# Patient Record
Sex: FEMALE | Race: WHITE | HISPANIC OR LATINO | Employment: UNEMPLOYED | URBAN - METROPOLITAN AREA
[De-identification: names, ages, dates, MRNs, and addresses within clinical notes are randomized per-mention and may not be internally consistent; named-entity substitution may affect disease eponyms.]

---

## 2017-02-23 ENCOUNTER — HOSPITAL ENCOUNTER (EMERGENCY)
Facility: HOSPITAL | Age: 42
Discharge: HOME | End: 2017-02-23
Attending: EMERGENCY MEDICINE
Payer: MEDICAID

## 2017-02-23 VITALS
SYSTOLIC BLOOD PRESSURE: 112 MMHG | DIASTOLIC BLOOD PRESSURE: 58 MMHG | BODY MASS INDEX: 30.61 KG/M2 | HEIGHT: 67 IN | WEIGHT: 195 LBS | TEMPERATURE: 98 F | OXYGEN SATURATION: 96 % | HEART RATE: 65 BPM | RESPIRATION RATE: 16 BRPM

## 2017-02-23 DIAGNOSIS — R07.89 NON-CARDIAC CHEST PAIN: ICD-10-CM

## 2017-02-23 DIAGNOSIS — R07.89 CHEST WALL PAIN: Primary | ICD-10-CM

## 2017-02-23 LAB
ALBUMIN SERPL BCP-MCNC: 3.7 G/DL
ALP SERPL-CCNC: 92 U/L
ALT SERPL W/O P-5'-P-CCNC: 20 U/L
ANION GAP SERPL CALC-SCNC: 9 MMOL/L
AST SERPL-CCNC: 18 U/L
B-HCG UR QL: NEGATIVE
BASOPHILS # BLD AUTO: 0.02 K/UL
BASOPHILS NFR BLD: 0.2 %
BILIRUB SERPL-MCNC: 0.4 MG/DL
BNP SERPL-MCNC: <10 PG/ML
BUN SERPL-MCNC: 13 MG/DL
CALCIUM SERPL-MCNC: 8.9 MG/DL
CHLORIDE SERPL-SCNC: 107 MMOL/L
CO2 SERPL-SCNC: 22 MMOL/L
CREAT SERPL-MCNC: 0.7 MG/DL
CTP QC/QA: YES
DIFFERENTIAL METHOD: ABNORMAL
EOSINOPHIL # BLD AUTO: 0.2 K/UL
EOSINOPHIL NFR BLD: 2.2 %
ERYTHROCYTE [DISTWIDTH] IN BLOOD BY AUTOMATED COUNT: 16.1 %
EST. GFR  (AFRICAN AMERICAN): >60 ML/MIN/1.73 M^2
EST. GFR  (NON AFRICAN AMERICAN): >60 ML/MIN/1.73 M^2
GLUCOSE SERPL-MCNC: 100 MG/DL
HCT VFR BLD AUTO: 33.6 %
HGB BLD-MCNC: 11.1 G/DL
INR PPP: 1
LYMPHOCYTES # BLD AUTO: 3.2 K/UL
LYMPHOCYTES NFR BLD: 36.8 %
MCH RBC QN AUTO: 25.1 PG
MCHC RBC AUTO-ENTMCNC: 33 %
MCV RBC AUTO: 76 FL
MONOCYTES # BLD AUTO: 0.6 K/UL
MONOCYTES NFR BLD: 6.3 %
NEUTROPHILS # BLD AUTO: 4.8 K/UL
NEUTROPHILS NFR BLD: 54.4 %
PLATELET # BLD AUTO: 435 K/UL
PMV BLD AUTO: 10.4 FL
POCT GLUCOSE: 107 MG/DL (ref 70–110)
POTASSIUM SERPL-SCNC: 4.1 MMOL/L
PROT SERPL-MCNC: 7.8 G/DL
PROTHROMBIN TIME: 10.8 SEC
RBC # BLD AUTO: 4.43 M/UL
SODIUM SERPL-SCNC: 138 MMOL/L
TROPONIN I SERPL DL<=0.01 NG/ML-MCNC: <0.006 NG/ML
WBC # BLD AUTO: 8.75 K/UL

## 2017-02-23 PROCEDURE — 93005 ELECTROCARDIOGRAM TRACING: CPT

## 2017-02-23 PROCEDURE — 99284 EMERGENCY DEPT VISIT MOD MDM: CPT | Mod: 25

## 2017-02-23 PROCEDURE — 85025 COMPLETE CBC W/AUTO DIFF WBC: CPT

## 2017-02-23 PROCEDURE — 83880 ASSAY OF NATRIURETIC PEPTIDE: CPT

## 2017-02-23 PROCEDURE — 25000003 PHARM REV CODE 250: Performed by: EMERGENCY MEDICINE

## 2017-02-23 PROCEDURE — 84484 ASSAY OF TROPONIN QUANT: CPT

## 2017-02-23 PROCEDURE — 82962 GLUCOSE BLOOD TEST: CPT

## 2017-02-23 PROCEDURE — 63600175 PHARM REV CODE 636 W HCPCS: Performed by: EMERGENCY MEDICINE

## 2017-02-23 PROCEDURE — 96375 TX/PRO/DX INJ NEW DRUG ADDON: CPT

## 2017-02-23 PROCEDURE — 80053 COMPREHEN METABOLIC PANEL: CPT

## 2017-02-23 PROCEDURE — 96374 THER/PROPH/DIAG INJ IV PUSH: CPT

## 2017-02-23 PROCEDURE — 85610 PROTHROMBIN TIME: CPT

## 2017-02-23 PROCEDURE — 81025 URINE PREGNANCY TEST: CPT | Performed by: EMERGENCY MEDICINE

## 2017-02-23 RX ORDER — ONDANSETRON 2 MG/ML
4 INJECTION INTRAMUSCULAR; INTRAVENOUS
Status: COMPLETED | OUTPATIENT
Start: 2017-02-23 | End: 2017-02-23

## 2017-02-23 RX ORDER — KETOROLAC TROMETHAMINE 10 MG/1
10 TABLET, FILM COATED ORAL EVERY 12 HOURS PRN
Qty: 6 TABLET | Refills: 0 | Status: SHIPPED | OUTPATIENT
Start: 2017-02-23 | End: 2017-02-26

## 2017-02-23 RX ORDER — ASPIRIN 325 MG
325 TABLET ORAL
Status: COMPLETED | OUTPATIENT
Start: 2017-02-23 | End: 2017-02-23

## 2017-02-23 RX ORDER — MORPHINE SULFATE 10 MG/ML
2 INJECTION INTRAMUSCULAR; INTRAVENOUS; SUBCUTANEOUS ONCE
Status: COMPLETED | OUTPATIENT
Start: 2017-02-23 | End: 2017-02-23

## 2017-02-23 RX ORDER — CETIRIZINE HYDROCHLORIDE 5 MG/1
5 TABLET ORAL DAILY
COMMUNITY
End: 2017-02-23

## 2017-02-23 RX ORDER — KETOROLAC TROMETHAMINE 30 MG/ML
15 INJECTION, SOLUTION INTRAMUSCULAR; INTRAVENOUS
Status: COMPLETED | OUTPATIENT
Start: 2017-02-23 | End: 2017-02-23

## 2017-02-23 RX ADMIN — ASPIRIN 325 MG ORAL TABLET 325 MG: 325 PILL ORAL at 09:02

## 2017-02-23 RX ADMIN — MORPHINE SULFATE 2 MG: 10 INJECTION INTRAVENOUS at 09:02

## 2017-02-23 RX ADMIN — KETOROLAC TROMETHAMINE 15 MG: 30 INJECTION, SOLUTION INTRAMUSCULAR at 10:02

## 2017-02-23 RX ADMIN — ONDANSETRON 4 MG: 2 INJECTION INTRAMUSCULAR; INTRAVENOUS at 10:02

## 2017-02-23 NOTE — ED AVS SNAPSHOT
[facility]  [address]  [place] [address]               Brittni M Prabhu   2017  8:52 AM   ED    Descripción:  Female : 1975   Departamento:  [facility]           Covarrubias Cuidado fue coordinado por:     Provider Role From To    Damir Wang MD Attending Provider 17 0852 --      Razón de la rachel     Chest Pain           Diagnósticos de Esta Visita        Comentarios    Chest wall pain    -  Primario     Non-cardiac chest pain           ED Disposition     ED Disposition Condition Comment    Discharge             Lista de tareas           Información de seguimiento     Realice un seguimiento con:  Prieto Olmstead III, MD    Cuándo:      Especialidad:  Internal Medicine    Información de contacto:    [address] [place] [address]  [phone]        Recetas para recoger        Disp Refills Start End    ketorolac (TORADOL) 10 mg tablet 6 tablet 0 2017    Take 1 tablet (10 mg total) by mouth every 12 (twelve) hours as needed for Pain. - Oral    ranitidine (ZANTAC) 150 MG capsule 60 capsule 11 2017    Take 1 capsule (150 mg total) by mouth 2 (two) times daily. - Oral      [facility]     [facility]ferm[facility] - Asistencia   Enfermeras registradas de [facility] pueden ayudarle a reservar shatni rachel, proveer educación para la marissa, asesoría clínica, y otros servicios de asesoramiento.   Llame para john servicio gratuito a [phone].             Medicamentos           EMPEZAR a peggy estos medicamentos NUEVOS        Refills    ketorolac (TORADOL) 10 mg tablet 0    Sig: Take 1 tablet (10 mg total) by mouth every 12 (twelve) hours as needed for Pain.    Categoría: Print    Vía: Oral    ranitidine (ZANTAC) 150 MG capsule 11    Sig: Take 1 capsule (150 mg total) by mouth 2 (two) times daily.    Categoría: Print    Vía: Oral      These medications were  "administered today        Dose Freq    aspirin tablet 325 mg 325 mg ED 1 Time    Sig: Take 1 tablet (325 mg total) by mouth ED 1 Time.    Categoría: Normal    Vía: Oral    morphine injection 2 mg 2 mg Once    Sig: Inject 0.2 mLs (2 mg total) into the vein once.    Categoría: Normal    Vía: Intravenous    ketorolac injection 15 mg 15 mg ED 1 Time    Sig: Inject 15 mg into the vein ED 1 Time.    Categoría: Normal    Vía: Intravenous    ondansetron injection 4 mg 4 mg ED 1 Time    Sig: Inject 4 mg into the vein ED 1 Time.    Categoría: Normal    Vía: Intravenous      DEJAR de peggy estos medicamentos     cetirizine (ZYRTEC) 5 MG tablet Take 5 mg by mouth once daily.           Verifique que la siguiente lista de medicamentos es shanti representación exacta de los medicamentos que está tomando actualmente. Si no hay ningunos reportados, la lista puede estar en rose. Si no es correcta, por favor póngase en contacto con eric proveedor de atención médica. Lleve esta lista con usted en aubrie de emergencia.           Medicamentos Actuales     acetaminophen (TYLENOL) 325 MG tablet Take 325 mg by mouth every 6 (six) hours as needed for Pain.    ketorolac (TORADOL) 10 mg tablet Take 1 tablet (10 mg total) by mouth every 12 (twelve) hours as needed for Pain.    ranitidine (ZANTAC) 150 MG capsule Take 1 capsule (150 mg total) by mouth 2 (two) times daily.           Información de referencia clínica           Maureen signos vitales soha     PS Pulso Temperatura Resp [place]    112/57 56 98.6 °F (37 °C) (Oral) 20 5' 7" (1.702 m) 88.5 kg (195 lb)    SpO2 BMI (IMC)                96% 30.54 kg/m2          Alergias     A partir del:  2/23/2017        No Known Allergies      Vacunas     Administradas en la fecha de la visita:  2/23/2017        None      ED Micro, Lab, POCT     Start Ordered       Status Ordering Provider    02/23/17 1021 02/23/17 1021  POCT glucose  Once      Final result     02/23/17 0854 02/23/17 0853    Once,   Status:  " Canceled      Canceled     02/23/17 0854 02/23/17 0853  POCT urine pregnancy  Once      Final result     02/23/17 0854 02/23/17 0853  CBC auto differential  STAT      Final result     02/23/17 0854 02/23/17 0853  Comprehensive metabolic panel  STAT      Final result     02/23/17 0854 02/23/17 0853  Protime-INR  STAT      Final result     02/23/17 0854 02/23/17 0853  Troponin I  Now then every 3 hours     Comments:  PLEASE REVIEW ORDER START TIME BEFORE MARKING SPECIMEN COLLECTED.   Start Status   02/23/17 0854 Final result   02/23/17 1154 Acknowledged       Acknowledged     02/23/17 0854 02/23/17 0853  B-Type natriuretic peptide (BNP)  STAT      Final result       ED Imaging Orders     Start Ordered       Status Ordering Provider    02/23/17 0854 02/23/17 0853  X-Ray Chest PA And Lateral  1 time imaging      Final result         Instrucciones a lashawn de oneida           Chest Wall Pain: Costochondritis    The chest pain that you have had today is caused by costochondritis. This condition is caused by an inflammation of the cartilage joining your ribs to your breastbone. It is not caused by heart or lung problems. The inflammation may have been brought on by a blow to the chest, lifting heavy objects, intense exercise, or an illness that made you cough and sneeze. It often occurs during times of emotional stress. It can be painful, but it is not dangerous. It usually goes away in 1 to 2 weeks. But it may happen again. Rarely, a more serious condition may cause symptoms similar to costochondritis. Thats why its important to watch for the warning signs listed below.  Home care  Follow these guidelines when caring for yourself at home:  · If you feel that emotional stress is a cause of your condition, try to figure out the sources of that stress. It may not be obvious! Learn ways to deal with the stress in your life. This can include regular exercise, muscle relaxation, meditation, or simply taking time out for yourself.  For more information about this, talk with your health care provider. Or go to your local library and look at books on stress reduction.  · You may use acetaminophen or ibuprofen to control pain, unless another pain medicine was prescribed. If you have liver disease or ever had a stomach ulcer, talk with your health care provider before using these medicines.  · You can also help ease pain by using a hot, wet compress or heating pad. Use this with or without a medicated skin cream that helps relieves pain.  · Do stretching exercise as advised by your provider.  · Take any prescribed medicines as directed.  Follow-up care  Follow up with your health care provider, or as advised, if you do not start to get better in the next 2 days.  When to seek medical advice  Call your health care provider right away if any of these occur:  · A change in the type of pain. Call if it feels different, becomes more serious, lasts longer, or spreads into your shoulder, arm, neck, jaw, or back.  · Shortness of breath or pain gets worse when you breathe  · Weakness, dizziness, or fainting  · Cough with dark-colored sputum (phlegm) or blood  · Abdominal pain  · Dark red or black stools  · Fever of 100.4ºF (38ºC) or higher, or as directed by your health care provider  Date Last Reviewed: 11/24/2014  © 2665-5531 [employer]. [address] [place] [address]. All rights reserved. This information is not intended as a substitute for professional medical care. Always follow your healthcare professional's instructions.          Registrarse para [facility]     La activación de eric cuenta [facility] es tan fácil franci 1-2-3!    1) Ir a [email or web address], seleccione Registrarse Ahora, meter el código de activación y eric fecha de nacimiento, y seleccione Próximo.    [id number]  Expires: 4/9/2017 11:48 AM      2) Crear un nombre de usuario y contraseña para usar cuando se visita [facility] en el futuro y selecciona shanti  pregunta de seguridad en aubrie de que pierda eric contraseña y seleccione Próximo.    3) Introduzca eric dirección de correo electrónico y sandra mark en Registrarse!    Información Adicional  Si tiene alguna pregunta, por favor, e-mail [email or web address] o llame al [phone] para hablar con nuestro personal. Recuerde, [facility] no debe ser usada para necesidades urgentes. En aubrie de emergencia médica, llame al [phone].         [facility] cumple con las leyes federales aplicables de derechos civiles y no discrimina por motivos de judy, color, origen nacional, edad, discapacidad, o sexo.        Language Assistance Services     ATTENTION: Language assistance services are available, free of charge. Please call [phone].      ATENCIÓN: Si habla español, tiene a eric disposición servicios gratuitos de asistencia lingüística. Llame al [phone].     CHÚ Ý: N?u b?n nói Ti?ng Vi?t, có các d?ch v? h? tr? ngôn ng? mi?n phí dành cho b?n. G?i s? [phone].                      [facility]  [address] [facility]  [place] [address]               Brittni Pelletier   2017  8:52 AM   ED    Description:  Female : 1975   Department:  [facility]           Your Care was Coordinated By:     Provider Role From To    Damir Wang MD Attending Provider 17 0129 --      Reason for Visit     Chest Pain           Diagnoses this Visit        Comments    Chest wall pain    -  Primary     Non-cardiac chest pain           ED Disposition     ED Disposition Condition Comment    Discharge             To Do List           Follow-up Information     Follow up with Prieto Olmstead III, MD In 1 week(s).    Specialty:  Internal Medicine    Contact information:    [address]  [address] [facility]  [place] [id number] [phone]         These Medications        Disp Refills Start End    ketorolac (TORADOL) 10 mg tablet 6 tablet 0 2017  2/26/2017    Take 1 tablet (10 mg total) by mouth every 12 (twelve) hours as needed for Pain. - Oral    ranitidine (ZANTAC) 150 MG capsule 60 capsule 11 2/23/2017 2/23/2018    Take 1 capsule (150 mg total) by mouth 2 (two) times daily. - Oral      [facility]     [facility] On Call Nurse Care Line - 24/7 Assistance  Registered nurses in the [facility] On Call Center provide clinical advisement, health education, appointment booking, and other advisory services.  Call for this free service at [phone].             Medications           START taking these NEW medications        Refills    ketorolac (TORADOL) 10 mg tablet 0    Sig: Take 1 tablet (10 mg total) by mouth every 12 (twelve) hours as needed for Pain.    Class: Print    Route: Oral    ranitidine (ZANTAC) 150 MG capsule 11    Sig: Take 1 capsule (150 mg total) by mouth 2 (two) times daily.    Class: Print    Route: Oral      These medications were administered today        Dose Freq    aspirin tablet 325 mg 325 mg ED 1 Time    Sig: Take 1 tablet (325 mg total) by mouth ED 1 Time.    Class: Normal    Route: Oral    morphine injection 2 mg 2 mg Once    Sig: Inject 0.2 mLs (2 mg total) into the vein once.    Class: Normal    Route: Intravenous    ketorolac injection 15 mg 15 mg ED 1 Time    Sig: Inject 15 mg into the vein ED 1 Time.    Class: Normal    Route: Intravenous    ondansetron injection 4 mg 4 mg ED 1 Time    Sig: Inject 4 mg into the vein ED 1 Time.    Class: Normal    Route: Intravenous      STOP taking these medications     cetirizine (ZYRTEC) 5 MG tablet Take 5 mg by mouth once daily.           Verify that the below list of medications is an accurate representation of the medications you are currently taking.  If none reported, the list may be blank. If incorrect, please contact your healthcare provider. Carry this list with you in case of emergency.           Current Medications     acetaminophen (TYLENOL) 325 MG tablet Take 325 mg by mouth  "every 6 (six) hours as needed for Pain.    ketorolac (TORADOL) 10 mg tablet Take 1 tablet (10 mg total) by mouth every 12 (twelve) hours as needed for Pain.    ranitidine (ZANTAC) 150 MG capsule Take 1 capsule (150 mg total) by mouth 2 (two) times daily.           Clinical Reference Information           Your Vitals Were     BP Pulse Temp Resp Height Weight    112/57 56 98.6 °F (37 °C) (Oral) 20 5' 7" (1.702 m) 88.5 kg (195 lb)    SpO2 BMI                96% 30.54 kg/m2          Allergies as of 2/23/2017     No Known Allergies      Immunizations Administered on Date of Encounter - 2/23/2017     None      ED Micro, Lab, POCT     Start Ordered       Status Ordering Provider    02/23/17 1021 02/23/17 1021  POCT glucose  Once      Final result     02/23/17 0854 02/23/17 0853    Once,   Status:  Canceled      Canceled     02/23/17 0854 02/23/17 0853  POCT urine pregnancy  Once      Final result     02/23/17 0854 02/23/17 0853  CBC auto differential  STAT      Final result     02/23/17 0854 02/23/17 0853  Comprehensive metabolic panel  STAT      Final result     02/23/17 0854 02/23/17 0853  Protime-INR  STAT      Final result     02/23/17 0854 02/23/17 0853  Troponin I  Now then every 3 hours     Comments:  PLEASE REVIEW ORDER START TIME BEFORE MARKING SPECIMEN COLLECTED.   Start Status   02/23/17 0854 Final result   02/23/17 1154 Acknowledged       Acknowledged     02/23/17 0854 02/23/17 0853  B-Type natriuretic peptide (BNP)  STAT      Final result       ED Imaging Orders     Start Ordered       Status Ordering Provider    02/23/17 0854 02/23/17 0853  X-Ray Chest PA And Lateral  1 time imaging      Final result         Discharge Instructions           Chest Wall Pain: Costochondritis    The chest pain that you have had today is caused by costochondritis. This condition is caused by an inflammation of the cartilage joining your ribs to your breastbone. It is not caused by heart or lung problems. The inflammation may have " been brought on by a blow to the chest, lifting heavy objects, intense exercise, or an illness that made you cough and sneeze. It often occurs during times of emotional stress. It can be painful, but it is not dangerous. It usually goes away in 1 to 2 weeks. But it may happen again. Rarely, a more serious condition may cause symptoms similar to costochondritis. Thats why its important to watch for the warning signs listed below.  Home care  Follow these guidelines when caring for yourself at home:  · If you feel that emotional stress is a cause of your condition, try to figure out the sources of that stress. It may not be obvious! Learn ways to deal with the stress in your life. This can include regular exercise, muscle relaxation, meditation, or simply taking time out for yourself. For more information about this, talk with your health care provider. Or go to your local library and look at books on stress reduction.  · You may use acetaminophen or ibuprofen to control pain, unless another pain medicine was prescribed. If you have liver disease or ever had a stomach ulcer, talk with your health care provider before using these medicines.  · You can also help ease pain by using a hot, wet compress or heating pad. Use this with or without a medicated skin cream that helps relieves pain.  · Do stretching exercise as advised by your provider.  · Take any prescribed medicines as directed.  Follow-up care  Follow up with your health care provider, or as advised, if you do not start to get better in the next 2 days.  When to seek medical advice  Call your health care provider right away if any of these occur:  · A change in the type of pain. Call if it feels different, becomes more serious, lasts longer, or spreads into your shoulder, arm, neck, jaw, or back.  · Shortness of breath or pain gets worse when you breathe  · Weakness, dizziness, or fainting  · Cough with dark-colored sputum (phlegm) or blood  · Abdominal  pain  · Dark red or black stools  · Fever of 100.4ºF (38ºC) or higher, or as directed by your health care provider  Date Last Reviewed: 11/24/2014  © 2155-3938 [employer]. [address] [place] [address], [place] [address]. All rights reserved. This information is not intended as a substitute for professional medical care. Always follow your healthcare professional's instructions.          [facility] Sign-Up     Activating your [facility] account is as easy as 1-2-3!     1) Visit [email or web address], select Sign Up Now, enter this activation code and your date of birth, then select Next.  [email or web address]  Expires: 4/9/2017 11:48 AM      2) Create a username and password to use when you visit [facility] in the future and select a security question in case you lose your password and select Next.    3) Enter your e-mail address and click Sign Up!    Additional Information  If you have questions, please e-mail [email or web address] or call [phone] to talk to our [facility] staff. Remember, [facility] is NOT to be used for urgent needs. For medical emergencies, dial 911.          [facility] complies with applicable Federal civil rights laws and does not discriminate on the basis of race, color, national origin, age, disability, or sex.        Language Assistance Services     ATTENTION: Language assistance services are available, free of charge. Please call [phone].      ATENCIÓN: Si habla jack, tiene a eric disposición servicios gratuitos de asistencia lingüística. Llame al [phone].     CHÚ Ý: N?u b?n nói Ti?ng Vi?t, có các d?ch v? h? tr? ngôn ng? mi?n phí dành cho b?n. G?i s? [phone].

## 2017-02-23 NOTE — ED PROVIDER NOTES
"Encounter Date: 2/23/2017    SCRIBE #1 NOTE: I, Alex Fuentes, am scribing for, and in the presence of,  Be Wang MD. I have scribed the following portions of the note - Other sections scribed: HPI and ROS.       History     Chief Complaint   Patient presents with    Chest Pain     pt presents to ED with c/o intermittent mid-sternal CP since 0400 "the pain is now a constant pound, like a pressure" pain radiates to right hand     Review of patient's allergies indicates:  No Known Allergies  HPI Comments: Chief Complaint: Chest Pain    HPI: This 41 y.o. Female with no known PMHx presents to the ED c/o left side chest pain. Symptoms were intermittent wit onset last night. However, symptoms have been constant since 4 am this morning. Pain is severe and described as a pressure. Pain also radiates to the back. There's associated SOB. Symptoms are not relieved with Tylenol. Patient denies any recent trauma. She also denies any fevers, cough or leg swelling. No previous episodes of current symptoms.      The history is provided by the patient. No  was used.     History reviewed. No pertinent past medical history.  No past medical history pertinent negatives.  No past surgical history on file.  No family history on file.  Social History   Substance Use Topics    Smoking status: Never Smoker    Smokeless tobacco: None    Alcohol use No     Review of Systems   Constitutional: Negative for chills and fever.   HENT: Negative for ear pain and sore throat.    Eyes: Negative for visual disturbance.   Respiratory: Positive for shortness of breath. Negative for cough, wheezing and stridor.    Cardiovascular: Positive for chest pain (chest wall). Negative for palpitations and leg swelling.   Gastrointestinal: Negative for abdominal pain, constipation, diarrhea, nausea and vomiting.   Genitourinary: Negative for difficulty urinating and dysuria.   Musculoskeletal: Negative for back pain.   Skin: " Negative for rash.   Neurological: Negative for dizziness, weakness, light-headedness and headaches.   Hematological: Does not bruise/bleed easily.       Physical Exam   Initial Vitals   BP Pulse Resp Temp SpO2   02/23/17 0850 02/23/17 0850 02/23/17 0850 02/23/17 0850 02/23/17 0850   134/63 64 20 98.6 °F (37 °C) 97 %     Physical Exam    Vitals reviewed.  Constitutional: She appears well-developed and well-nourished.   HENT:   Head: Normocephalic and atraumatic.   Nose: Nose normal.   Mouth/Throat: No oropharyngeal exudate.   Eyes: EOM are normal. Pupils are equal, round, and reactive to light.   Neck: Normal range of motion. Neck supple. No JVD present.   Cardiovascular: Regular rhythm and normal heart sounds. Exam reveals no gallop and no friction rub.    No murmur heard.  Pulmonary/Chest: Breath sounds normal. No stridor. No respiratory distress. She has no wheezes. She has no rhonchi. She has no rales. She exhibits tenderness and bony tenderness. She exhibits no laceration, no crepitus, no edema, no deformity, no swelling and no retraction.   Patient winces in pain with light palpation of the anterior chest wall.  The overlying skin appears normal.   Abdominal: Soft. Bowel sounds are normal. She exhibits no distension and no mass. There is no tenderness. There is no rebound and no guarding.   Musculoskeletal: Normal range of motion. She exhibits no edema or tenderness.   Neurological: She is alert and oriented to person, place, and time. She has normal strength. No sensory deficit.   Skin: Skin is warm and dry.   Psychiatric: She has a normal mood and affect. Thought content normal.         ED Course   Procedures  Labs Reviewed   CBC W/ AUTO DIFFERENTIAL - Abnormal; Notable for the following:        Result Value    Hemoglobin 11.1 (*)     Hematocrit 33.6 (*)     MCV 76 (*)     MCH 25.1 (*)     RDW 16.1 (*)     Platelets 435 (*)     All other components within normal limits    Narrative:     PLEASE REVIEW ORDER  START TIME BEFORE MARKING SPECIMEN  COLLECTED.   COMPREHENSIVE METABOLIC PANEL - Abnormal; Notable for the following:     CO2 22 (*)     All other components within normal limits    Narrative:     PLEASE REVIEW ORDER START TIME BEFORE MARKING SPECIMEN  COLLECTED.   PROTIME-INR    Narrative:     PLEASE REVIEW ORDER START TIME BEFORE MARKING SPECIMEN  COLLECTED.   TROPONIN I    Narrative:     PLEASE REVIEW ORDER START TIME BEFORE MARKING SPECIMEN  COLLECTED.   B-TYPE NATRIURETIC PEPTIDE    Narrative:     PLEASE REVIEW ORDER START TIME BEFORE MARKING SPECIMEN  COLLECTED.   TROPONIN I   POCT URINE PREGNANCY   POCT GLUCOSE     EKG Readings: (Independently Interpreted)   Initial Reading: No STEMI. Rhythm: Normal Sinus Rhythm. Ectopy: No Ectopy. Conduction: Normal. ST Segments: Normal ST Segments. T Waves: Normal. Clinical Impression: Normal Sinus Rhythm       X-Rays:   Independently Interpreted Readings:   Chest X-Ray: Normal heart size.  No infiltrates.  No acute abnormalities.     Medical Decision Making:   History:   Old Medical Records: I decided to obtain old medical records.      Medical decision-making:    The patient received a medical screening exam. If performed, the EKG was independently evaluated by me and is pending final cardiology evaluation.  If performed, all radiographic studies were independently evaluated by me and are pending final radiology evaluation. If labs were ordered, they were reviewed. Vital signs are independently assessed by me.  If performed, the pulse oximetry was independently evaluated by me.  I decided to obtain the patient's past medical record.  If available, I reviewed the patient's past medical record, including most recent labs and radiology reports.    This is an emergent evaluation for a patient with chest pain.The patient's pain is atypical for cardiac etiology.  I decided to obtain and review the patient's past medical record.        The vital signs are stable in the room.     CP is completely reproducible on palpation of the anterior chest wall.  The overlying skin appears normal and is without rash. Clinically, she has acute chest wall pain.   EKG is normal.  There is no evidence of STEMI or ischemia.    CXR is negative for pneumonia, pneumothorax and edema.  Troponin is negative and was drawn at least 8 hours since the onset of pain.  I doubt ACS.  BNP is negative.  There is no evidence of congestive heart failure.  The electrolytes are relatively normal.  The pt is not anemic.  Wells score zero and PERC negative. Doubt PE.     The pt's symptoms were treated with:    Medications   aspirin tablet 325 mg (325 mg Oral Given 2/23/17 0925)   morphine injection 2 mg (2 mg Intravenous Given 2/23/17 0925)   ketorolac injection 15 mg (15 mg Intravenous Given 2/23/17 1052)   ondansetron injection 4 mg (4 mg Intravenous Given 2/23/17 1053)       Currently the patient has a a non-diagnostic EKG with negative troponin in the emergency department.  I doubt acute coronary syndrome.  I did inform the patient that even with negative testing, we can never eliminate all risk.  I believe the patient is low risk with negative initial testing; they are appropriate for close outpatient follow-up.  The patient is aware of the small but persistent risk for MI/ACS with subsequent cardiac complications or death.  I have low suspicion for cardiopulmonary, vascular, infectious, respiratory, or other emergent medical condition based on my evaluation in the ED.     The patient's pain is currently improved.      The results and physical exam findings were reviewed with the patient. Pt agrees with assessment, disposition and treatment plan and has no further questions or complaints at this time.      FELIPE Wang M.D. 11:52 AM 2/23/2017         Scribe Attestation:   Scribe #1: I performed the above scribed service and the documentation accurately describes the services I performed. I attest to the accuracy of  the note.    Attending Attestation:           Physician Attestation for Scribe:  Physician Attestation Statement for Scribe #1: I, Be Wang MD, reviewed documentation, as scribed by Alex Fuentes in my presence, and it is both accurate and complete.                 ED Course     Clinical Impression:   The primary encounter diagnosis was Chest wall pain. A diagnosis of Non-cardiac chest pain was also pertinent to this visit.          Damir Wang MD  02/23/17 [id number]

## 2017-02-23 NOTE — ED TRIAGE NOTES
Pt reports continuous stabbing midsternal chest pain 10 out of 10 since 0400 today. Pt reports right hand pain and SOB. Denies nv. Pt reports she took tylenol at around 0400 this morning, but says it didn't relieve the pain. Will continue to monitor.

## 2017-02-23 NOTE — DISCHARGE INSTRUCTIONS
Chest Wall Pain: Costochondritis    The chest pain that you have had today is caused by costochondritis. This condition is caused by an inflammation of the cartilage joining your ribs to your breastbone. It is not caused by heart or lung problems. The inflammation may have been brought on by a blow to the chest, lifting heavy objects, intense exercise, or an illness that made you cough and sneeze. It often occurs during times of emotional stress. It can be painful, but it is not dangerous. It usually goes away in 1 to 2 weeks. But it may happen again. Rarely, a more serious condition may cause symptoms similar to costochondritis. Thats why its important to watch for the warning signs listed below.  Home care  Follow these guidelines when caring for yourself at home:  · If you feel that emotional stress is a cause of your condition, try to figure out the sources of that stress. It may not be obvious! Learn ways to deal with the stress in your life. This can include regular exercise, muscle relaxation, meditation, or simply taking time out for yourself. For more information about this, talk with your health care provider. Or go to your local library and look at books on stress reduction.  · You may use acetaminophen or ibuprofen to control pain, unless another pain medicine was prescribed. If you have liver disease or ever had a stomach ulcer, talk with your health care provider before using these medicines.  · You can also help ease pain by using a hot, wet compress or heating pad. Use this with or without a medicated skin cream that helps relieves pain.  · Do stretching exercise as advised by your provider.  · Take any prescribed medicines as directed.  Follow-up care  Follow up with your health care provider, or as advised, if you do not start to get better in the next 2 days.  When to seek medical advice  Call your health care provider right away if any of these occur:  · A change in the type of pain. Call  if it feels different, becomes more serious, lasts longer, or spreads into your shoulder, arm, neck, jaw, or back.  · Shortness of breath or pain gets worse when you breathe  · Weakness, dizziness, or fainting  · Cough with dark-colored sputum (phlegm) or blood  · Abdominal pain  · Dark red or black stools  · Fever of 100.4ºF (38ºC) or higher, or as directed by your health care provider  Date Last Reviewed: 11/24/2014  © 7728-1899 [employer]. [address] [place] [address]. All rights reserved. This information is not intended as a substitute for professional medical care. Always follow your healthcare professional's instructions.

## 2017-03-06 ENCOUNTER — HOSPITAL ENCOUNTER (EMERGENCY)
Facility: HOSPITAL | Age: 42
Discharge: HOME | End: 2017-03-06
Attending: EMERGENCY MEDICINE
Payer: MEDICAID

## 2017-03-06 VITALS
HEIGHT: 67 IN | WEIGHT: 190 LBS | DIASTOLIC BLOOD PRESSURE: 70 MMHG | RESPIRATION RATE: 18 BRPM | SYSTOLIC BLOOD PRESSURE: 140 MMHG | TEMPERATURE: 98 F | OXYGEN SATURATION: 96 % | BODY MASS INDEX: 29.82 KG/M2 | HEART RATE: 97 BPM

## 2017-03-06 DIAGNOSIS — M54.12 CERVICAL RADICULOPATHY: Primary | ICD-10-CM

## 2017-03-06 PROCEDURE — 25000003 PHARM REV CODE 250: Performed by: EMERGENCY MEDICINE

## 2017-03-06 PROCEDURE — 96372 THER/PROPH/DIAG INJ SC/IM: CPT

## 2017-03-06 PROCEDURE — 99283 EMERGENCY DEPT VISIT LOW MDM: CPT | Mod: 25

## 2017-03-06 PROCEDURE — 63600175 PHARM REV CODE 636 W HCPCS: Performed by: EMERGENCY MEDICINE

## 2017-03-06 RX ORDER — HYDROCODONE BITARTRATE AND ACETAMINOPHEN 5; 325 MG/1; MG/1
1 TABLET ORAL EVERY 4 HOURS PRN
Qty: 10 TABLET | Refills: 0 | Status: SHIPPED | OUTPATIENT
Start: 2017-03-06 | End: 2017-03-16

## 2017-03-06 RX ORDER — PREDNISONE 20 MG/1
60 TABLET ORAL
Status: COMPLETED | OUTPATIENT
Start: 2017-03-06 | End: 2017-03-06

## 2017-03-06 RX ORDER — HYDROMORPHONE HYDROCHLORIDE 2 MG/ML
1 INJECTION, SOLUTION INTRAMUSCULAR; INTRAVENOUS; SUBCUTANEOUS
Status: COMPLETED | OUTPATIENT
Start: 2017-03-06 | End: 2017-03-06

## 2017-03-06 RX ORDER — ORPHENADRINE CITRATE 30 MG/ML
60 INJECTION INTRAMUSCULAR; INTRAVENOUS
Status: COMPLETED | OUTPATIENT
Start: 2017-03-06 | End: 2017-03-06

## 2017-03-06 RX ORDER — CYCLOBENZAPRINE HCL 10 MG
10 TABLET ORAL 3 TIMES DAILY PRN
Qty: 15 TABLET | Refills: 0 | Status: SHIPPED | OUTPATIENT
Start: 2017-03-06 | End: 2017-03-11

## 2017-03-06 RX ORDER — ONDANSETRON 4 MG/1
4 TABLET, ORALLY DISINTEGRATING ORAL
Status: COMPLETED | OUTPATIENT
Start: 2017-03-06 | End: 2017-03-06

## 2017-03-06 RX ORDER — PREDNISONE 20 MG/1
60 TABLET ORAL DAILY
Qty: 12 TABLET | Refills: 0 | Status: SHIPPED | OUTPATIENT
Start: 2017-03-06

## 2017-03-06 RX ADMIN — ONDANSETRON 4 MG: 4 TABLET, ORALLY DISINTEGRATING ORAL at 01:03

## 2017-03-06 RX ADMIN — PREDNISONE 60 MG: 20 TABLET ORAL at 11:03

## 2017-03-06 RX ADMIN — HYDROMORPHONE HYDROCHLORIDE 1 MG: 2 INJECTION INTRAMUSCULAR; INTRAVENOUS; SUBCUTANEOUS at 11:03

## 2017-03-06 RX ADMIN — ORPHENADRINE CITRATE 60 MG: 30 INJECTION INTRAMUSCULAR; INTRAVENOUS at 11:03

## 2017-03-06 NOTE — DISCHARGE INSTRUCTIONS
¿Qué es la radiculopatía cervical?    La radiculopatía cervical es shanti irritación o inflamación de shanti raíz nerviosa en el sinai. Provoca dolor en el sinai y otros síntomas que pueden propagarse al pecho o a lo melita del brazo. Para entender esta afección, ayuda comprender cómo se compone la columna vertebral:   · Vértebras. Son huesos que se apilan unos sobre otros para formar la columna vertebral. La columna cervical contiene las siete vértebras del sinai.  · Discos. Son las almohadillas blandas de tejido entre las vértebras. Actúan franci amortiguadores para la columna.  · Elcanal arias. Es un túnel que se forma dentro de las vértebras apiladas. La médula arias pasa a través de john canal.  · Nervios. Se ramifican desde la médula arias. Al salir del canal arias, los nervios pasan por aberturas entre las vértebras. La raíz nerviosa es la parte de un nervio que está más cerca de la médula arias.  Cuando hay radiculopatía cervical, las raíces nerviosas del sinai están irritadas. [place] produce dolor y síntomas que pueden desplazarse hacia los nervios que van desde la médula arias hasta los brazos y el torso.  ¿Cuáles son las causas de la radiculopatía cervical?  Envejecer, shanti lesión, kaylyn postura y otros factores pueden ocasionar problemas en el sinai. Estos problemas pueden luego irritar las raíces nerviosas. Incluyen:  · Daños en un disco en la columna cervical. En kenia aubrie, el disco dañado puede presionar sobre las raíces nerviosas cercanas.  · Degeneración a causa del desgaste y el envejecimiento. [place] puede ocasionar un estrechamiento (estenosis) de las aberturas entre las vértebras. Las aberturas que se chisholm estrechado presionan sobre las raíces nerviosas cuando estas salen del canal arias.  · Shanti columna inestable. [place] sucede cuando shanti vértebra se desliza hacia adelante. Puede hacer presión sobre la raíz nerviosa.  Hay otras cosas, menos comunes, que ejercen presión sobre los nervios del  sinai. Por ejemplo, infección, quistes y tumores.  Síntomas de la radiculopatía cervical  Incluyen:  · Dolor en el sinai  · Dolor, entumecimiento, cosquilleo o debilidad que bajan por el brazo  · Pérdida de movimiento del sinai  · Espasmos musculares  Tratamiento de la radiculopatía cervical  En la mayoría de los casos, eric proveedor de atención médica intentará jamie con tratamientos que ayudan a aliviar los síntomas. Por ejemplo:  · Medicamentos analgésicos (calmantes del dolor) recetados o de venta phyllis. Ayudan a aliviar el dolor y la hinchazón.  · Compresas frías. Estos métodos ayudan a reducir el dolor.  · Reposo. Implica evitar las posturas y actividades que aumentan el dolor.  · Soporte para el sinai (sinai ortopédico). [place] puede ayudar a aliviar la inflamación y el dolor.  · Fisioterapia, incluyendo ejercicios físicos y estiramientos. Puede ayudar a reducir el dolor y a aumentar el movimiento y el funcionamiento.  · Inyecciones de medicamentosalrededor de las raíces nerviosas. [place] se hace para ayudar a aliviar los síntomas por algún tiempo.  En algunos casos, eric proveedor de atención médica puede indicarle shanti cirugía para corregir el problema subyacente. Eso depende de la causa, los síntomas y cuánto hace que tiene el dolor.  Posibles complicaciones  Con el tiempo, un nervio irritado e inflamado puede dañarse. Eso puede ocasionar entumecimiento o debilidad a melita plazo (permanente). Si antoinette síntomas cambian de manera repentina o empeoran, asegúrese de comentárselo a eric proveedor de atención médica.  Cuándo llamar a eric proveedor de atención médica  Llame a eric proveedor de atención médica de inmediato si nota alguno de los siguientes síntomas:  · Dolor nuevo o dolor que empeora  · Debilidad, cosquilleo o entumecimiento nuevos o que empeoran en eric brazo o eric mano  · Cambios en eric vejiga o antoinette intestinos   Date Last Reviewed: 3/10/2016  © 0367-3595 [employer]. [address] [place] [address],  [place] [address]. All rights reserved. This information is not intended as a substitute for professional medical care. Always follow your healthcare professional's instructions.          Understanding Cervical Radiculopathy    Cervical radiculopathy is irritation or inflammation of a nerve root in the neck. It causes neck pain and other symptoms that may spread into the chest or down the arm. To understand this condition, it helps to understand the parts of the spine:  · Vertebrae. These are bones that stack to form the spine. The cervical spine contains the 7 vertebrae in the neck.  · Disks. These are soft pads of tissue between the vertebrae. They act as shock absorbers for the spine.  · The spinal canal. This is a tunnel formed within the stacked vertebrae. The spinal cord runs through this canal.  · Nerves. These branch off the spinal cord. As they leave the spinal canal, nerves pass through openings between the vertebrae. The nerve root is the part of the nerve that is closest to the spinal cord.   With cervical radiculopathy, nerve roots in the neck become irritated. This leads to pain and symptoms that can travel to the nerves that go from the spinal cord down the arms and into the torso.  What causes cervical radiculopathy?  Aging, injury, poor posture, and other issues can lead to problems in the neck. These problems may then irritate nerve roots. These include:  · Damage to a disk in the cervical spine. The damaged disk may then press on nearby nerve roots.  · Degeneration from wear and tear, and aging. This can lead to narrowing (stenosis) of the openings between the vertebrae. The narrowed openings press on nerve roots as they leave the spinal canal.  · An unstable spine. This is when a vertebra slips forward. It can then press on a nerve root.  There are other, less common causes of pressure on nerves in the neck. These include infection, cysts, and tumors.  Symptoms of cervical radiculopathy  These  include:  · Neck pain  · Pain, numbness, tingling, or weakness that travels down the arm  · Loss of neck movement  · Muscle spasms  Treatment for cervical radiculopathy  In most cases, your healthcare provider will first try treatments that help relieve symptoms. These may include:  · Prescription or over-the-counter pain medicines. These help relieve pain and swelling.  · Cold packs. These help reduce pain.  · Resting. This involves avoiding positions and activities that increase pain.  · Neck brace (cervical collar). This can help relieve inflammation and pain.  · Physical therapy, including exercises and stretches. This can help decrease pain and increase movement and function.  · Shots of medicinesaround the nerve roots. This is done to help relieve symptoms for a time.  In some cases, your healthcare provider may advise surgery to fix the underlying problem. This depends on the cause, the symptoms, and how long the pain has lasted.  Possible complications  Over time, an irritated and inflamed nerve may become damaged. This may lead to long-lasting (permanent) numbness or weakness. If symptoms change suddenly or get worse, be sure to let your healthcare provider know.     When to call your healthcare provider  Call your healthcare provider right away if you have any of these:  · New pain or pain that gets worse  · New or increasing weakness, numbness, or tingling in your arm or hand  · Bowel or bladder changes   Date Last Reviewed: 3/10/2016  © 6150-6561 [employer]. [address] [place] [address]. All rights reserved. This information is not intended as a substitute for professional medical care. Always follow your healthcare professional's instructions.          Cervical Spine Problems: Disk  The spine has three natural curves. The cervical curve is located in the neck. It forms the top part of the spine. For this reason, it is also called the cervical spine. This sheet tells you more  about the parts of the cervical spine and damaged disks. This is a common problem that can affect the cervical spine, but most people don't need surgery for this.   A healthy cervical spine  The spine is made up of the following:    · Vertebrae. These are bones stacked like building blocks that make up the spine. The neck contains the first seven vertebrae of the spine.  · Disks. These are small pads of tissue that lie between the vertebrae. They help cushion and protect the vertebrae when you move.  · The spinal cord. This runs through a large central opening (spinal canal) formed by the vertebrae.  · Nerves. These branch from the spinal cord and carry messages to the body.  · Foramina. These are smaller openings in the vertebrae. Nerves travel to your arms and other parts of your body from the spinal cord through these openings.  Damaged disks in the cervical spine    One of the most common cervical spine problems is a damaged disk. A disk may be injured by a sudden movement, causing a disk to bulge or break open (herniate). Or a disk may wear out slowly over time (degenerate). A worn-out disk may become so flat that the vertebrae above and below it touch or slip back and forth. As disks wear out, abnormal bone growths (bone spurs) can form on the vertebrae. Bone spurs can also form in the foramina, causing them to narrow (stenosis). If your healthcare provider suspects that you have a damaged disk, tests may be done to confirm the problem, such as an MRI, CT, or EMG. Your healthcare provider will then work with you to plan treatment as needed.   Date Last Reviewed: 10/4/2015  © 2959-4218 [employer]. [address] [place] [address], [place] [address]. All rights reserved. This information is not intended as a substitute for professional medical care. Always follow your healthcare professional's instructions.          Problemas de la columna cervical: Disco  La columna tiene darrell curvas naturales. La curva  cervical está ubicada cerca del sinai. Forma la parte de arriba de la columna. Por eso, se llama columna cervical. En esta hoja, encontrará más información sobre las partes de la columna cervical y sobre discos dañados, que es un problema común que puede afectar la columna cervical, migdalia la mayoría de la gente no necesita cirugía para esto.  Shanti columna cervical saludable  La columna vertebral está compuesta por los siguientes elementos:    · Vértebras. Son los huesos que están apilados en la espalda (franci si fuera shanti hilera de ladrillos de juguete) y dominique la columna. El sinai contiene las primeras siete vértebras de la columna.  · Discos. Son pequeñas almohadillas de tejido que están entre vértebra y vértebra. Funcionan franci un colchón para las vértebras y las protegen cuando usted se mueve.  · La médula arias. Corre por shanti abertura que tiene la columna en el centro (es el canal arias), formada por las vértebras.  · Nervios. Se ramifican desde la médula arias y llevan mensajes a todo el cuerpo.  · Orificios vertebrales (forámenes). Son pequeños agujeros en las vértebras. Los nervios van hasta antoinette brazos y otras partes de eric cuerpo desde eric médula arias, pasando a través de estos agujeros.  Discos dañados en la columna cervical    Doug de los problemas más comunes de la columna cervical es tener un disco dañado. Un disco puede dañarse por un movimiento brusco que hace que el disco se abulte o se jane (hernia). También puede desgastarse lentamente con el tiempo (el disco se degenera). Un disco desgastado puede volverse tan plano que la vértebra que está arriba y la vértebra que está abajo pueden tocarse o resbalar hacia adelante o atrás. Cuando los discos se desgastan, los huesos de las vértebras pueden tener crecimientos anormales (en forma de puntas). Estas puntas (espolones óseos) también pueden formarse dentro de los agujeros de las vértebras y hacer que se angosten (eso se llama estenosis). Si eric  proveedor de atención médica piensa que usted puede tener un disco dañado, debe hacerle pruebas para confirmar el problema. Pueden hacerle por ejemplo shanti resonancia magnética, shanti tomografía computarizada o shanti electromiografía. Entonces, eric proveedor de atención médica colaborará con usted para decidir el tratamiento que sea necesario.  Date Last Reviewed: 10/4/2015  © 7832-2634 [employer] [address] [place] [address], [place] [address]. Todos los derechos reservados. Esta información no pretende sustituir la atención médica profesional. Sólo eric médico puede diagnosticar y tratar un problema de marissa.

## 2017-03-06 NOTE — ED AVS SNAPSHOT
[facility]  [place] [address]               Brittni ASHLEY Prabhu   3/6/2017  9:25 AM   ED    Descripción:  Female : 1975   Departamento:  [facility]           Covarrubias Cuidado fue coordinado por:     Provider Role From To    Brandon Kendrick MD Attending Provider 17 0928 --      Razón de la rachel     Numbness     Hand Pain           Diagnósticos de Esta Visita        Comentarios    Cervical radiculopathy    -  Primario       ED Disposition     ED Disposition Condition Comment    Discharge  Our goal in the emergency department is to always give you outstanding care and exceptional service. You may receive a survey by mail or e-mail in the next week regarding your experience in our ED. We would greatly appreciate your completing and returnin g the survey. Your feedback provides us with a way to recognize our staff who give very good care and it helps us learn how to improve when your experience was below our aspiration of excellence.              Lista de tareas           Información de seguimiento     Realice un seguimiento con:  Prieto Olmstead III, MD    Especialidad:  Internal Medicine    Por qué:  AS PREVIOUSLY SCHEDULED    Información de contacto:    [address] [facility] [address]  [address] [facility]  [place] [address]  [phone]          Realice un seguimiento con:  [facility]    Especialidad:  Emergency Medicine    Por qué:  If symptoms worsen    Información de contacto:    [address] [place] [address]  [phone]      Recetas para recoger        Disp Refills Start End    predniSONE (DELTASONE) 20 MG tablet 12 tablet 0 3/6/2017     Take 3 tablets (60 mg total) by mouth once daily. FIRST DOSE TOMORROW - Oral    cyclobenzaprine (FLEXERIL) 10 MG tablet 15 tablet 0 3/6/2017 3/11/2017    Take 1 tablet (10 mg total) by mouth 3 (three) times daily as needed for Muscle spasms. - Oral     hydrocodone-acetaminophen 5-325mg (NORCO) 5-325 mg per tablet 10 tablet 0 3/6/2017 3/16/2017    Take 1 tablet by mouth every 4 (four) hours as needed for Pain. - Oral      [facility]     [facility] Enfermeras - Asistencia 24/7  Enfermeras registradas de [facility] pueden ayudarle a reservar shanti rachel, proveer educación para la marissa, asesoría clínica, y otros servicios de asesoramiento.   Llame para john servicio gratuito a [phone].             Medicamentos           EMPEZAR a peggy estos medicamentos NUEVOS        Refills    predniSONE (DELTASONE) 20 MG tablet 0    Sig: Take 3 tablets (60 mg total) by mouth once daily. FIRST DOSE TOMORROW    Categoría: Print    Vía: Oral    cyclobenzaprine (FLEXERIL) 10 MG tablet 0    Sig: Take 1 tablet (10 mg total) by mouth 3 (three) times daily as needed for Muscle spasms.    Categoría: Print    Vía: Oral    hydrocodone-acetaminophen 5-325mg (NORCO) 5-325 mg per tablet 0    Sig: Take 1 tablet by mouth every 4 (four) hours as needed for Pain.    Categoría: Print    Vía: Oral      These medications were administered today        Dose Freq    predniSONE tablet 60 mg 60 mg ED 1 Time    Sig: Take 3 tablets (60 mg total) by mouth ED 1 Time.    Categoría: Normal    Vía: Oral    hydromorphone (PF) injection 1 mg 1 mg ED 1 Time    Sig: Inject 0.5 mLs (1 mg total) into the muscle ED 1 Time.    Categoría: Normal    Vía: Intramuscular    orphenadrine injection 60 mg 60 mg ED 1 Time    Sig: Inject 2 mLs (60 mg total) into the muscle ED 1 Time.    Categoría: Normal    Vía: Intramuscular           Verifique que la siguiente lista de medicamentos es shanti representación exacta de los medicamentos que está tomando actualmente. Si no hay ningunos reportados, la lista puede estar en rose. Si no es correcta, por favor póngase en contacto con eric proveedor de atención médica. Lleve esta lista con usted en aubrie de emergencia.           Medicamentos Actuales      "acetaminophen (TYLENOL) 325 MG tablet Take 325 mg by mouth every 6 (six) hours as needed for Pain.    cyclobenzaprine (FLEXERIL) 10 MG tablet Take 1 tablet (10 mg total) by mouth 3 (three) times daily as needed for Muscle spasms.    hydrocodone-acetaminophen 5-325mg (NORCO) 5-325 mg per tablet Take 1 tablet by mouth every 4 (four) hours as needed for Pain.    hydromorphone (PF) injection 1 mg Inject 0.5 mLs (1 mg total) into the muscle ED 1 Time.    orphenadrine injection 60 mg Inject 2 mLs (60 mg total) into the muscle ED 1 Time.    predniSONE (DELTASONE) 20 MG tablet Take 3 tablets (60 mg total) by mouth once daily. FIRST DOSE TOMORROW    predniSONE tablet 60 mg Take 3 tablets (60 mg total) by mouth ED 1 Time.    ranitidine (ZANTAC) 150 MG capsule Take 1 capsule (150 mg total) by mouth 2 (two) times daily.           Información de referencia clínica           Maureen signos vitales soha     PS Pulso Temperatura Resp [place]    121/76 (BP Location: Left arm, Patient Position: Sitting, BP Method: Automatic) 76 98.9 °F (37.2 °C) (Oral) 18 5' 7" (1.702 m) 86.2 kg (190 lb)    SpO2 BMI (IMC)                98% 29.76 kg/m2          Alergias     A partir del:  3/6/2017        No Known Allergies      Vacunas     Administradas en la fecha de la visita:  3/6/2017        None      ED Micro, Lab, POCT     None      ED Imaging Orders     None        Instrucciones a lashawn de oneida         ¿Qué es la radiculopatía cervical?    La radiculopatía cervical es shanti irritación o inflamación de shanti raíz nerviosa en el sinai. Provoca dolor en el sinai y otros síntomas que pueden propagarse al pecho o a lo melita del brazo. Para entender esta afección, ayuda comprender cómo se compone la columna vertebral:   · Vértebras. Son huesos que se apilan unos sobre otros para formar la columna vertebral. La columna cervical contiene las siete vértebras del sinai.  · Discos. Son las almohadillas blandas de tejido entre las vértebras. Actúan franci " amortiguadores para la columna.  · Elcanal arias. Es un túnel que se forma dentro de las vértebras apiladas. La médula arias pasa a través de john canal.  · Nervios. Se ramifican desde la médula arias. Al salir del canal arias, los nervios pasan por aberturas entre las vértebras. La raíz nerviosa es la parte de un nervio que está más cerca de la médula arias.  Cuando hay radiculopatía cervical, las raíces nerviosas del sinai están irritadas. La [place] produce dolor y síntomas que pueden desplazarse hacia los nervios que van desde la médula arias hasta los brazos y el torso.  ¿Cuáles son las causas de la radiculopatía cervical?  Envejecer, shanti lesión, kaylyn postura y otros factores pueden ocasionar problemas en el sinai. Estos problemas pueden luego irritar las raíces nerviosas. Incluyen:  · Daños en un disco en la columna cervical. En kenia aubrie, el disco dañado puede presionar sobre las raíces nerviosas cercanas.  · Degeneración a causa del desgaste y el envejecimiento. La Verkin puede ocasionar un estrechamiento (estenosis) de las aberturas entre las vértebras. Las aberturas que se chisholm estrechado presionan sobre las raíces nerviosas cuando estas salen del canal arias.  · Shanti columna inestable. La Verkin sucede cuando shanti vértebra se desliza hacia adelante. Puede hacer presión sobre la raíz nerviosa.  Hay otras cosas, menos comunes, que ejercen presión sobre los nervios del sinai. Por ejemplo, infección, quistes y tumores.  Síntomas de la radiculopatía cervical  Incluyen:  · Dolor en el sinai  · Dolor, entumecimiento, cosquilleo o debilidad que bajan por el brazo  · Pérdida de movimiento del sinai  · Espasmos musculares  Tratamiento de la radiculopatía cervical  En la mayoría de los casos, eric proveedor de atención médica intentará jamie con tratamientos que ayudan a aliviar los síntomas. Por ejemplo:  · Medicamentos analgésicos (calmantes del dolor) recetados o de venta phyllis. Ayudan a aliviar el dolor y la  hinchazón.  · Compresas frías. Estos métodos ayudan a reducir el dolor.  · Reposo. Implica evitar las posturas y actividades que aumentan el dolor.  · Soporte para el sinai (sinai ortopédico). Colona puede ayudar a aliviar la inflamación y el dolor.  · Fisioterapia, incluyendo ejercicios físicos y estiramientos. Puede ayudar a reducir el dolor y a aumentar el movimiento y el funcionamiento.  · Inyecciones de medicamentosalrededor de las raíces nerviosas. Colona se hace para ayudar a aliviar los síntomas por algún tiempo.  En algunos casos, eric proveedor de atención médica puede indicarle shanti cirugía para corregir el problema subyacente. Eso depende de la causa, los síntomas y cuánto hace que tiene el dolor.  Posibles complicaciones  Con el tiempo, un nervio irritado e inflamado puede dañarse. Eso puede ocasionar entumecimiento o debilidad a melita plazo (permanente). Si antoinette síntomas cambian de manera repentina o empeoran, asegúrese de comentárselo a eric proveedor de atención médica.  Cuándo llamar a eric proveedor de atención médica  Llame a eric proveedor de atención médica de inmediato si nota alguno de los siguientes síntomas:  · Dolor nuevo o dolor que empeora  · Debilidad, cosquilleo o entumecimiento nuevos o que empeoran en eric brazo o eric mano  · Cambios en eric vejiga o antoinette intestinos   Date Last Reviewed: 3/10/2016  © 7131-5307 [employer]. [address] [place] [address]. All rights reserved. This information is not intended as a substitute for professional medical care. Always follow your healthcare professional's instructions.          Understanding Cervical Radiculopathy    Cervical radiculopathy is irritation or inflammation of a nerve root in the neck. It causes neck pain and other symptoms that may spread into the chest or down the arm. To understand this condition, it helps to understand the parts of the spine:  · Vertebrae. These are bones that stack to form the spine. The cervical spine  contains the 7 vertebrae in the neck.  · Disks. These are soft pads of tissue between the vertebrae. They act as shock absorbers for the spine.  · The spinal canal. This is a tunnel formed within the stacked vertebrae. The spinal cord runs through this canal.  · Nerves. These branch off the spinal cord. As they leave the spinal canal, nerves pass through openings between the vertebrae. The nerve root is the part of the nerve that is closest to the spinal cord.   With cervical radiculopathy, nerve roots in the neck become irritated. This leads to pain and symptoms that can travel to the nerves that go from the spinal cord down the arms and into the torso.  What causes cervical radiculopathy?  Aging, injury, poor posture, and other issues can lead to problems in the neck. These problems may then irritate nerve roots. These include:  · Damage to a disk in the cervical spine. The damaged disk may then press on nearby nerve roots.  · Degeneration from wear and tear, and aging. This can lead to narrowing (stenosis) of the openings between the vertebrae. The narrowed openings press on nerve roots as they leave the spinal canal.  · An unstable spine. This is when a vertebra slips forward. It can then press on a nerve root.  There are other, less common causes of pressure on nerves in the neck. These include infection, cysts, and tumors.  Symptoms of cervical radiculopathy  These include:  · Neck pain  · Pain, numbness, tingling, or weakness that travels down the arm  · Loss of neck movement  · Muscle spasms  Treatment for cervical radiculopathy  In most cases, your healthcare provider will first try treatments that help relieve symptoms. These may include:  · Prescription or over-the-counter pain medicines. These help relieve pain and swelling.  · Cold packs. These help reduce pain.  · Resting. This involves avoiding positions and activities that increase pain.  · Neck brace (cervical collar). This can help relieve  inflammation and pain.  · Physical therapy, including exercises and stretches. This can help decrease pain and increase movement and function.  · Shots of medicinesaround the nerve roots. This is done to help relieve symptoms for a time.  In some cases, your healthcare provider may advise surgery to fix the underlying problem. This depends on the cause, the symptoms, and how long the pain has lasted.  Possible complications  Over time, an irritated and inflamed nerve may become damaged. This may lead to long-lasting (permanent) numbness or weakness. If symptoms change suddenly or get worse, be sure to let your healthcare provider know.     When to call your healthcare provider  Call your healthcare provider right away if you have any of these:  · New pain or pain that gets worse  · New or increasing weakness, numbness, or tingling in your arm or hand  · Bowel or bladder changes   Date Last Reviewed: 3/10/2016  © 5640-9989 [employer]. [address] [place] [address]. All rights reserved. This information is not intended as a substitute for professional medical care. Always follow your healthcare professional's instructions.          Cervical Spine Problems: Disk  The spine has three natural curves. The cervical curve is located in the neck. It forms the top part of the spine. For this reason, it is also called the cervical spine. This sheet tells you more about the parts of the cervical spine and damaged disks. This is a common problem that can affect the cervical spine, but most people don't need surgery for this.   A healthy cervical spine  The spine is made up of the following:    · Vertebrae. These are bones stacked like building blocks that make up the spine. The neck contains the first seven vertebrae of the spine.  · Disks. These are small pads of tissue that lie between the vertebrae. They help cushion and protect the vertebrae when you move.  · The spinal cord. This runs through a  large central opening (spinal canal) formed by the vertebrae.  · Nerves. These branch from the spinal cord and carry messages to the body.  · Foramina. These are smaller openings in the vertebrae. Nerves travel to your arms and other parts of your body from the spinal cord through these openings.  Damaged disks in the cervical spine    One of the most common cervical spine problems is a damaged disk. A disk may be injured by a sudden movement, causing a disk to bulge or break open (herniate). Or a disk may wear out slowly over time (degenerate). A worn-out disk may become so flat that the vertebrae above and below it touch or slip back and forth. As disks wear out, abnormal bone growths (bone spurs) can form on the vertebrae. Bone spurs can also form in the foramina, causing them to narrow (stenosis). If your healthcare provider suspects that you have a damaged disk, tests may be done to confirm the problem, such as an MRI, CT, or EMG. Your healthcare provider will then work with you to plan treatment as needed.   Date Last Reviewed: 10/4/2015  © 3767-5343 [employer]. [address] [place] [address]. All rights reserved. This information is not intended as a substitute for professional medical care. Always follow your healthcare professional's instructions.          Problemas de la columna cervical: Disco  La columna tiene darrell curvas naturales. La curva cervical está ubicada cerca del sinai. Forma la parte de arriba de la columna. Por eso, se llama columna cervical. En esta hoja, encontrará más información sobre las partes de la columna cervical y sobre discos dañados, que es un problema común que puede afectar la columna cervical, migdalia la mayoría de la gente no necesita cirugía para esto.  Shanti columna cervical saludable  La columna vertebral está compuesta por los siguientes elementos:    · Vértebras. Son los huesos que están apilados en la espalda (franci si fuera shanti hilera de ladrillos  de juguete) y dominique la columna. El sinai contiene las primeras siete vértebras de la columna.  · Discos. Son pequeñas almohadillas de tejido que están entre vértebra y vértebra. Funcionan franci un colchón para las vértebras y las protegen cuando usted se mueve.  · La médula arias. Corre por shanti abertura que tiene la columna en el centro (es el canal arias), formada por las vértebras.  · Nervios. Se ramifican desde la médula arias y llevan mensajes a todo el cuerpo.  · Orificios vertebrales (forámenes). Son pequeños agujeros en las vértebras. Los nervios van hasta antoinette brazos y otras partes de eric cuerpo desde eric médula arias, pasando a través de estos agujeros.  Discos dañados en la columna cervical    Doug de los problemas más comunes de la columna cervical es tener un disco dañado. Un disco puede dañarse por un movimiento brusco que hace que el disco se abulte o se jane (hernia). También puede desgastarse lentamente con el tiempo (el disco se degenera). Un disco desgastado puede volverse tan plano que la vértebra que está arriba y la vértebra que está abajo pueden tocarse o resbalar hacia adelante o atrás. Cuando los discos se desgastan, los huesos de las vértebras pueden tener crecimientos anormales (en forma de puntas). Estas puntas (espolones óseos) también pueden formarse dentro de los agujeros de las vértebras y hacer que se angosten (eso se llama estenosis). Si eric proveedor de atención médica piensa que usted puede tener un disco dañado, debe hacerle pruebas para confirmar el problema. Pueden hacerle por ejemplo shanti resonancia magnética, shanti tomografía computarizada o shanti electromiografía. Entonces, eric proveedor de atención médica colaborará con usted para decidir el tratamiento que sea necesario.  Date Last Reviewed: 10/4/2015  © 8002-5813 [employer]. [address] [place] [address], [place] [address]. Todos los derechos reservados. Esta información no pretende sustituir la atención médica  profesional. Sólo eric médico puede diagnosticar y tratar un problema de marissa.          Registrarse para [facility]     La activación de eric cuenta [facility] es tan fácil franci 1-2-3!    1) Ir a [email or web address], seleccione Registrarse Ahora, meter el código de activación y eric fecha de nacimiento, y seleccione Próximo.    [id number]  Expires: 2017 11:48 AM      2) Crear un nombre de usuario y contraseña para usar cuando se visita [facility] en el futuro y selecciona shanti pregunta de seguridad en aubrie de que pierda eric contraseña y seleccione Próximo.    3) Introduzca eric dirección de correo electrónico y sandra clic en Registrarse!    Información Adicional  Si tiene alguna pregunta, por favor, e-mail [email or web address] o llame al [phone] para hablar con nuestro personal. Recuerde, [facility] no debe ser usada para necesidades urgentes. En aubrie de emergencia médica, llame al [phone].         [facility] cumple con las leyes federales aplicables de derechos civiles y no discrimina por motivos de judy, color, origen nacional, edad, discapacidad, o sexo.        Language Assistance Services     ATTENTION: Language assistance services are available, free of charge. Please call [phone].      ATENCIÓN: Si habla español, tiene a eric disposición servicios gratuitos de asistencia lingüística. Llame al [phone].     CHÚ Ý: N?u b?n nói Ti?ng Vi?t, có các d?ch v? h? tr? ngôn ng? mi?n phí dành cho b?n. G?i s? [phone].                      [facility]  [address]  [place] [address]               Brittni Pelletier   3/6/2017  9:25 AM   ED    Description:  Female : 1975   Department:  [facility]           Your Care was Coordinated By:     Provider Role From To    Brandon Kendrick MD Attending Provider 17 0928 --      Reason for Visit     Numbness     Hand Pain           Diagnoses this Visit        Comments    Cervical radiculopathy     -  Primary       ED Disposition     ED Disposition Condition Comment    Discharge  Our goal in the emergency department is to always give you outstanding care and exceptional service. You may receive a survey by mail or e-mail in the next week regarding your experience in our ED. We would greatly appreciate your completing and returnin g the survey. Your feedback provides us with a way to recognize our staff who give very good care and it helps us learn how to improve when your experience was below our aspiration of excellence.              To Do List           Follow-up Information     Follow up with Prieto Olmstead III, MD.    Specialty:  Internal Medicine    Why:  AS PREVIOUSLY SCHEDULED    Contact information:    [address] [facility] [address]  [address] [facility]  [place] [address]  [phone]          Follow up with [facility].    Specialty:  Emergency Medicine    Why:  If symptoms worsen    Contact information:    [address] [place] [address] [phone]       These Medications        Disp Refills Start End    predniSONE (DELTASONE) 20 MG tablet 12 tablet 0 3/6/2017     Take 3 tablets (60 mg total) by mouth once daily. FIRST DOSE TOMORROW - Oral    cyclobenzaprine (FLEXERIL) 10 MG tablet 15 tablet 0 3/6/2017 3/11/2017    Take 1 tablet (10 mg total) by mouth 3 (three) times daily as needed for Muscle spasms. - Oral    hydrocodone-acetaminophen 5-325mg (NORCO) 5-325 mg per tablet 10 tablet 0 3/6/2017 3/16/2017    Take 1 tablet by mouth every 4 (four) hours as needed for Pain. - Oral      [facility]     [facility] - 24/7 Assistance  Registered nurses in the [facility] provide clinical advisement, health education, appointment booking, and other advisory services.  Call for this free service at [phone].             Medications           START taking these NEW medications        Refills    predniSONE (DELTASONE) 20 MG tablet 0     Sig: Take 3 tablets (60 mg total) by mouth once daily. FIRST DOSE TOMORROW    Class: Print    Route: Oral    cyclobenzaprine (FLEXERIL) 10 MG tablet 0    Sig: Take 1 tablet (10 mg total) by mouth 3 (three) times daily as needed for Muscle spasms.    Class: Print    Route: Oral    hydrocodone-acetaminophen 5-325mg (NORCO) 5-325 mg per tablet 0    Sig: Take 1 tablet by mouth every 4 (four) hours as needed for Pain.    Class: Print    Route: Oral      These medications were administered today        Dose Freq    predniSONE tablet 60 mg 60 mg ED 1 Time    Sig: Take 3 tablets (60 mg total) by mouth ED 1 Time.    Class: Normal    Route: Oral    hydromorphone (PF) injection 1 mg 1 mg ED 1 Time    Sig: Inject 0.5 mLs (1 mg total) into the muscle ED 1 Time.    Class: Normal    Route: Intramuscular    orphenadrine injection 60 mg 60 mg ED 1 Time    Sig: Inject 2 mLs (60 mg total) into the muscle ED 1 Time.    Class: Normal    Route: Intramuscular           Verify that the below list of medications is an accurate representation of the medications you are currently taking.  If none reported, the list may be blank. If incorrect, please contact your healthcare provider. Carry this list with you in case of emergency.           Current Medications     acetaminophen (TYLENOL) 325 MG tablet Take 325 mg by mouth every 6 (six) hours as needed for Pain.    cyclobenzaprine (FLEXERIL) 10 MG tablet Take 1 tablet (10 mg total) by mouth 3 (three) times daily as needed for Muscle spasms.    hydrocodone-acetaminophen 5-325mg (NORCO) 5-325 mg per tablet Take 1 tablet by mouth every 4 (four) hours as needed for Pain.    hydromorphone (PF) injection 1 mg Inject 0.5 mLs (1 mg total) into the muscle ED 1 Time.    orphenadrine injection 60 mg Inject 2 mLs (60 mg total) into the muscle ED 1 Time.    predniSONE (DELTASONE) 20 MG tablet Take 3 tablets (60 mg total) by mouth once daily. FIRST DOSE TOMORROW    predniSONE tablet 60 mg Take 3 tablets  "(60 mg total) by mouth ED 1 Time.    ranitidine (ZANTAC) 150 MG capsule Take 1 capsule (150 mg total) by mouth 2 (two) times daily.           Clinical Reference Information           Your Vitals Were     BP Pulse Temp Resp Height Weight    121/76 (BP Location: Left arm, Patient Position: Sitting, BP Method: Automatic) 76 98.9 °F (37.2 °C) (Oral) 18 5' 7" (1.702 m) 86.2 kg (190 lb)    SpO2 BMI                98% 29.76 kg/m2          Allergies as of 3/6/2017     No Known Allergies      Immunizations Administered on Date of Encounter - 3/6/2017     None      ED Micro, Lab, POCT     None      ED Imaging Orders     None        Discharge Instructions         ¿Qué es la radiculopatía cervical?    La radiculopatía cervical es shanti irritación o inflamación de shanti raíz nerviosa en el sinai. Provoca dolor en el sinai y otros síntomas que pueden propagarse al pecho o a lo melita del brazo. Para entender esta afección, ayuda comprender cómo se compone la columna vertebral:   · Vértebras. Son huesos que se apilan unos sobre otros para formar la columna vertebral. La columna cervical contiene las siete vértebras del sinai.  · Discos. Son las almohadillas blandas de tejido entre las vértebras. Actúan franci amortiguadores para la columna.  · Elcanal arias. Es un túnel que se forma dentro de las vértebras apiladas. La médula arias pasa a través de john canal.  · Nervios. Se ramifican desde la médula arias. Al salir del canal arias, los nervios pasan por aberturas entre las vértebras. La raíz nerviosa es la parte de un nervio que está más cerca de la médula arias.  Cuando hay radiculopatía cervical, las raíces nerviosas del sinai están irritadas. [place] produce dolor y síntomas que pueden desplazarse hacia los nervios que van desde la médula arias hasta los brazos y el torso.  ¿Cuáles son las causas de la radiculopatía cervical?  Envejecer, shanti lesión, kaylyn postura y otros factores pueden ocasionar problemas en el sinai. " Estos problemas pueden luego irritar las raíces nerviosas. Incluyen:  · Daños en un disco en la columna cervical. En kenia aubrie, el disco dañado puede presionar sobre las raíces nerviosas cercanas.  · Degeneración a causa del desgaste y el envejecimiento. [place] puede ocasionar un estrechamiento (estenosis) de las aberturas entre las vértebras. Las aberturas que se chisholm estrechado presionan sobre las raíces nerviosas cuando estas salen del canal arias.  · Jayna columna inestable. [place] sucede cuando jayna vértebra se desliza hacia adelante. Puede hacer presión sobre la raíz nerviosa.  Hay otras cosas, menos comunes, que ejercen presión sobre los nervios del sinai. Por ejemplo, infección, quistes y tumores.  Síntomas de la radiculopatía cervical  Incluyen:  · Dolor en el sinai  · Dolor, entumecimiento, cosquilleo o debilidad que bajan por el brazo  · Pérdida de movimiento del sinai  · Espasmos musculares  Tratamiento de la radiculopatía cervical  En la mayoría de los casos, eric proveedor de atención médica intentará jamie con tratamientos que ayudan a aliviar los síntomas. Por ejemplo:  · Medicamentos analgésicos (calmantes del dolor) recetados o de venta phyllis. Ayudan a aliviar el dolor y la hinchazón.  · Compresas frías. Estos métodos ayudan a reducir el dolor.  · Reposo. Implica evitar las posturas y actividades que aumentan el dolor.  · Soporte para el sinai (sinai ortopédico). [place] puede ayudar a aliviar la inflamación y el dolor.  · Fisioterapia, incluyendo ejercicios físicos y estiramientos. Puede ayudar a reducir el dolor y a aumentar el movimiento y el funcionamiento.  · Inyecciones de medicamentosalrededor de las raíces nerviosas. [place] se hace para ayudar a aliviar los síntomas por algún tiempo.  En algunos casos, eric proveedor de atención médica puede indicarle jayna cirugía para corregir el problema subyacente. Eso depende de la causa, los síntomas y cuánto hace que tiene el dolor.  Posibles  complicaciones  Con el tiempo, un nervio irritado e inflamado puede dañarse. Eso puede ocasionar entumecimiento o debilidad a melita plazo (permanente). Si antoinette síntomas cambian de manera repentina o empeoran, asegúrese de comentárselo a eric proveedor de atención médica.  Cuándo llamar a eric proveedor de atención médica  Llame a eric proveedor de atención médica de inmediato si nota alguno de los siguientes síntomas:  · Dolor nuevo o dolor que empeora  · Debilidad, cosquilleo o entumecimiento nuevos o que empeoran en eric brazo o eric mano  · Cambios en eric vejiga o antoinette intestinos   Date Last Reviewed: 3/10/2016  © 9468-1750 [employer]. [address] [place] [address]. All rights reserved. This information is not intended as a substitute for professional medical care. Always follow your healthcare professional's instructions.          Understanding Cervical Radiculopathy    Cervical radiculopathy is irritation or inflammation of a nerve root in the neck. It causes neck pain and other symptoms that may spread into the chest or down the arm. To understand this condition, it helps to understand the parts of the spine:  · Vertebrae. These are bones that stack to form the spine. The cervical spine contains the 7 vertebrae in the neck.  · Disks. These are soft pads of tissue between the vertebrae. They act as shock absorbers for the spine.  · The spinal canal. This is a tunnel formed within the stacked vertebrae. The spinal cord runs through this canal.  · Nerves. These branch off the spinal cord. As they leave the spinal canal, nerves pass through openings between the vertebrae. The nerve root is the part of the nerve that is closest to the spinal cord.   With cervical radiculopathy, nerve roots in the neck become irritated. This leads to pain and symptoms that can travel to the nerves that go from the spinal cord down the arms and into the torso.  What causes cervical radiculopathy?  Aging, injury, poor  posture, and other issues can lead to problems in the neck. These problems may then irritate nerve roots. These include:  · Damage to a disk in the cervical spine. The damaged disk may then press on nearby nerve roots.  · Degeneration from wear and tear, and aging. This can lead to narrowing (stenosis) of the openings between the vertebrae. The narrowed openings press on nerve roots as they leave the spinal canal.  · An unstable spine. This is when a vertebra slips forward. It can then press on a nerve root.  There are other, less common causes of pressure on nerves in the neck. These include infection, cysts, and tumors.  Symptoms of cervical radiculopathy  These include:  · Neck pain  · Pain, numbness, tingling, or weakness that travels down the arm  · Loss of neck movement  · Muscle spasms  Treatment for cervical radiculopathy  In most cases, your healthcare provider will first try treatments that help relieve symptoms. These may include:  · Prescription or over-the-counter pain medicines. These help relieve pain and swelling.  · Cold packs. These help reduce pain.  · Resting. This involves avoiding positions and activities that increase pain.  · Neck brace (cervical collar). This can help relieve inflammation and pain.  · Physical therapy, including exercises and stretches. This can help decrease pain and increase movement and function.  · Shots of medicinesaround the nerve roots. This is done to help relieve symptoms for a time.  In some cases, your healthcare provider may advise surgery to fix the underlying problem. This depends on the cause, the symptoms, and how long the pain has lasted.  Possible complications  Over time, an irritated and inflamed nerve may become damaged. This may lead to long-lasting (permanent) numbness or weakness. If symptoms change suddenly or get worse, be sure to let your healthcare provider know.     When to call your healthcare provider  Call your healthcare provider right away  if you have any of these:  · New pain or pain that gets worse  · New or increasing weakness, numbness, or tingling in your arm or hand  · Bowel or bladder changes   Date Last Reviewed: 3/10/2016  © 0602-0233 [employer]. [address] [place] [address]. All rights reserved. This information is not intended as a substitute for professional medical care. Always follow your healthcare professional's instructions.          Cervical Spine Problems: Disk  The spine has three natural curves. The cervical curve is located in the neck. It forms the top part of the spine. For this reason, it is also called the cervical spine. This sheet tells you more about the parts of the cervical spine and damaged disks. This is a common problem that can affect the cervical spine, but most people don't need surgery for this.   A healthy cervical spine  The spine is made up of the following:    · Vertebrae. These are bones stacked like building blocks that make up the spine. The neck contains the first seven vertebrae of the spine.  · Disks. These are small pads of tissue that lie between the vertebrae. They help cushion and protect the vertebrae when you move.  · The spinal cord. This runs through a large central opening (spinal canal) formed by the vertebrae.  · Nerves. These branch from the spinal cord and carry messages to the body.  · Foramina. These are smaller openings in the vertebrae. Nerves travel to your arms and other parts of your body from the spinal cord through these openings.  Damaged disks in the cervical spine    One of the most common cervical spine problems is a damaged disk. A disk may be injured by a sudden movement, causing a disk to bulge or break open (herniate). Or a disk may wear out slowly over time (degenerate). A worn-out disk may become so flat that the vertebrae above and below it touch or slip back and forth. As disks wear out, abnormal bone growths (bone spurs) can form on the  vertebrae. Bone spurs can also form in the foramina, causing them to narrow (stenosis). If your healthcare provider suspects that you have a damaged disk, tests may be done to confirm the problem, such as an MRI, CT, or EMG. Your healthcare provider will then work with you to plan treatment as needed.   Date Last Reviewed: 10/4/2015  © 3211-0883 [employer]. [address] [place] [address]. All rights reserved. This information is not intended as a substitute for professional medical care. Always follow your healthcare professional's instructions.          Problemas de la columna cervical: Disco  La columna tiene darrell curvas naturales. La curva cervical está ubicada cerca del sinai. Forma la parte de arriba de la columna. Por eso, se llama columna cervical. En esta hoja, encontrará más información sobre las partes de la columna cervical y sobre discos dañados, que es un problema común que puede afectar la columna cervical, migdalia la mayoría de la gente no necesita cirugía para esto.  Shanti columna cervical saludable  La columna vertebral está compuesta por los siguientes elementos:    · Vértebras. Son los huesos que están apilados en la espalda (franci si fuera shanti hilera de ladrillos de juguete) y dominique la columna. El sinai contiene las primeras siete vértebras de la columna.  · Discos. Son pequeñas almohadillas de tejido que están entre vértebra y vértebra. Funcionan franci un colchón para las vértebras y las protegen cuando usted se mueve.  · La médula arias. Corre por shanti abertura que tiene la columna en el centro (es el canal arias), formada por las vértebras.  · Nervios. Se ramifican desde la médula arias y llevan mensajes a todo el cuerpo.  · Orificios vertebrales (forámenes). Son pequeños agujeros en las vértebras. Los nervios van hasta antoinette brazos y otras partes de eric cuerpo desde eric médula arias, pasando a través de estos agujeros.  Discos dañados en la columna cervical    Doug de  los problemas más comunes de la columna cervical es tener un disco dañado. Un disco puede dañarse por un movimiento brusco que hace que el disco se abulte o se jane (hernia). También puede desgastarse lentamente con el tiempo (el disco se degenera). Un disco desgastado puede volverse tan plano que la vértebra que está arriba y la vértebra que está abajo pueden tocarse o resbalar hacia adelante o atrás. Cuando los discos se desgastan, los huesos de las vértebras pueden tener crecimientos anormales (en forma de puntas). Estas puntas (espolones óseos) también pueden formarse dentro de los agujeros de las vértebras y hacer que se angosten (eso se llama estenosis). Si eric proveedor de atención médica piensa que usted puede tener un disco dañado, debe hacerle pruebas para confirmar el problema. Pueden hacerle por ejemplo shanti resonancia magnética, shanti tomografía computarizada o shanti electromiografía. Entonces, eric proveedor de atención médica colaborará con usted para decidir el tratamiento que sea necesario.  Date Last Reviewed: 10/4/2015  © 5430-2932 [employer]. [address] [place] [address]. Todos los derechos reservados. Esta información no pretende sustituir la atención médica profesional. Sólo eric médico puede diagnosticar y tratar un problema de marissa.          Darasjimena Sign-Up     Activating your [facility] account is as easy as 1-2-3!     1) Visit [email or web address], select Sign Up Now, enter this activation code and your date of birth, then select Next.  [email or web address]  Expires: 4/9/2017 11:48 AM      2) Create a username and password to use when you visit [facility] in the future and select a security question in case you lose your password and select Next.    3) Enter your e-mail address and click Sign Up!    Additional Information  If you have questions, please e-mail [email or web address] or call [phone] to talk to our [email or web address] staff. Remember, [facility] is NOT to be used for  urgent needs. For medical emergencies, dial 911.          [facility] complies with applicable Federal civil rights laws and does not discriminate on the basis of race, color, national origin, age, disability, or sex.        Language Assistance Services     ATTENTION: Language assistance services are available, free of charge. Please call [phone].      ATENCIÓN: Si habla español, tiene a eric disposición servicios gratuitos de asistencia lingüística. Llame al [phone].     EN Ý: N?u b?n nói Ti?ng Vi?t, có các d?ch v? h? tr? ngôn ng? mi?n phí dành cho b?n. G?i s? [phone].

## 2017-03-06 NOTE — ED TRIAGE NOTES
"Pt presents to ER with c/o numbness, tingling, and pain to right hand and wrist.  Symptoms began 2-3 days ago and have become worse.  Pt states she is unable to hold her toothbrush to brush her teeth.  Pt states "It feel like someone is trying to rip my hand apart from the inside."  "

## 2017-03-06 NOTE — ED PROVIDER NOTES
"Encounter Date: 3/6/2017    SCRIBE #1 NOTE: I, Any Anthony, am scribing for, and in the presence of,  Rudi Kendrick MD. I have scribed the following portions of the note - Other sections scribed: ROS and HPI.       History     Chief Complaint   Patient presents with    Numbness     Pt. states, "I can't brush my hair or lift my arm on the right side and it feels numb and is tingling".     Hand Pain     Review of patient's allergies indicates:  No Known Allergies  HPI Comments: CC: Hand Pain    HPI: This 41 y.o. female with a past medical history of GERD, presents to the ED complaining of a R arm pain, worse in her R wrist and R hand for last 3 days. She reports associated numbness to her R hand. She denies weakness to her R hand. Patient states a yr ago gallons of water bottle fell on her R shoulder. MRI showed bulging disc. She was in therapy for her shoulder/arm pain. She states she she was pain free until recently. She reports similar pain a month ago which went away without intervention. She reports picking up light grocery recently, however, pt does not think that may have triggered back her sx. She has an appointment scheduled with Dr. Olmstead (PCP) for next Thursday. She denies any recent steroid use. She denies hx of diabetes.     The history is provided by the patient. No  was used.     Past Medical History:   Diagnosis Date    GERD (gastroesophageal reflux disease)      History reviewed. No pertinent surgical history.  History reviewed. No pertinent family history.  Social History   Substance Use Topics    Smoking status: Never Smoker    Smokeless tobacco: None    Alcohol use No     Review of Systems   Constitutional: Negative for fever.   HENT: Negative for congestion.    Eyes: Negative for visual disturbance.   Respiratory: Negative for cough and shortness of breath.    Cardiovascular: Negative for chest pain.   Gastrointestinal: Negative for abdominal pain, diarrhea, nausea and " vomiting.   Genitourinary: Negative for frequency.   Musculoskeletal: Negative for back pain.        (+) pain to R arm, R wrist, R hand   Skin: Negative for rash.   Neurological: Positive for numbness (R hand). Negative for tremors, weakness and headaches.       Physical Exam   Initial Vitals   BP Pulse Resp Temp SpO2   03/06/17 0924 03/06/17 0924 03/06/17 0924 03/06/17 0924 03/06/17 0924   140/80 77 17 98.9 °F (37.2 °C) 98 %     Physical Exam    Nursing note and vitals reviewed.  Constitutional: She appears well-developed and well-nourished.  Non-toxic appearance. She does not appear ill.   HENT:   Head: Normocephalic and atraumatic.   Eyes: EOM are normal.   Neck: Neck supple.   Cardiovascular: Normal rate and regular rhythm.   Pulmonary/Chest: Effort normal and breath sounds normal. No respiratory distress.   Abdominal: Soft. Normal appearance and bowel sounds are normal. She exhibits no distension. There is no tenderness.   Musculoskeletal: Normal range of motion.        Right shoulder: She exhibits normal range of motion, no tenderness, no bony tenderness, no swelling, no effusion, no crepitus, no deformity, no laceration, no pain, no spasm, normal pulse and normal strength.        Right elbow: She exhibits normal range of motion, no swelling, no effusion, no deformity and no laceration.        Right wrist: She exhibits normal range of motion, no tenderness, no bony tenderness, no swelling, no effusion, no crepitus, no deformity and no laceration.        Cervical back: She exhibits spasm (R trapezius muscle).        Right upper arm: She exhibits no tenderness, no bony tenderness, no swelling, no edema, no deformity and no laceration.        Right forearm: She exhibits no tenderness, no bony tenderness, no swelling, no edema, no deformity and no laceration.   Neurological: She is alert. She has normal strength and normal reflexes. She displays a negative Romberg sign.   Skin: Skin is warm and dry.   Psychiatric:  She has a normal mood and affect.         ED Course   Procedures  Labs Reviewed - No data to display          Medical Decision Making:   History:   Old Medical Records: I decided to obtain old medical records.  ED Management:  This was a 41-year-old female complaining of right arm pain and numbness.  She has had symptoms similar to this in the past and was diagnosed with a bulging disc in the neck.  She denies any acute injury.  She denies weakness.  There is no fever or chills.  I am concerned about a radiculopathy, stroke, injury.  On exam, her distribution of pain and numbness is over the radial nerve.  This is most likely a radicular symptomology.  Her exam is benign, and her NIH stroke score is 0.  Her symptoms are most consistent with a radiculopathy.  She will be treated with steroids, muscle relaxers, and pain medication.  She is stable for discharge and will follow-up with her doctor as scheduled.            Scribe Attestation:   Scribe #1: I performed the above scribed service and the documentation accurately describes the services I performed. I attest to the accuracy of the note.    Attending Attestation:           Physician Attestation for Scribe:  Physician Attestation Statement for Scribe #1: I, Rudi Kendrick MD, reviewed documentation, as scribed by Any Anthony in my presence, and it is both accurate and complete.                 ED Course     Clinical Impression:   The encounter diagnosis was Cervical radiculopathy.    Disposition:   Disposition: Discharged  Condition: Stable       Brandon Kendrick MD  03/06/17 [id number]

## 2018-01-08 ENCOUNTER — CLINICAL SUPPORT (OUTPATIENT)
Dept: OCCUPATIONAL MEDICINE | Facility: CLINIC | Age: 43
End: 2018-01-08

## 2018-01-08 DIAGNOSIS — Z02.1 DRUG SCREENING, PRE-EMPLOYMENT: Primary | ICD-10-CM

## 2018-01-08 PROCEDURE — 80305 DRUG TEST PRSMV DIR OPT OBS: CPT | Mod: S$GLB,,, | Performed by: NURSE PRACTITIONER
